# Patient Record
Sex: MALE
[De-identification: names, ages, dates, MRNs, and addresses within clinical notes are randomized per-mention and may not be internally consistent; named-entity substitution may affect disease eponyms.]

---

## 2018-10-04 ENCOUNTER — APPOINTMENT (OUTPATIENT)
Dept: OTOLARYNGOLOGY | Facility: CLINIC | Age: 33
End: 2018-10-04
Payer: SELF-PAY

## 2018-10-04 ENCOUNTER — APPOINTMENT (OUTPATIENT)
Dept: OTOLARYNGOLOGY | Facility: CLINIC | Age: 33
End: 2018-10-04
Payer: COMMERCIAL

## 2018-10-04 VITALS — WEIGHT: 180 LBS | HEIGHT: 68 IN | BODY MASS INDEX: 27.28 KG/M2

## 2018-10-04 DIAGNOSIS — Z78.9 OTHER SPECIFIED HEALTH STATUS: ICD-10-CM

## 2018-10-04 DIAGNOSIS — R19.6 HALITOSIS: ICD-10-CM

## 2018-10-04 PROBLEM — Z00.00 ENCOUNTER FOR PREVENTIVE HEALTH EXAMINATION: Status: ACTIVE | Noted: 2018-10-04

## 2018-10-04 PROCEDURE — 41599A: CUSTOM

## 2018-10-04 PROCEDURE — 99203 OFFICE O/P NEW LOW 30 MIN: CPT | Mod: 25

## 2018-10-04 PROCEDURE — 31575 DIAGNOSTIC LARYNGOSCOPY: CPT

## 2024-01-04 ENCOUNTER — APPOINTMENT (OUTPATIENT)
Dept: OTOLARYNGOLOGY | Facility: CLINIC | Age: 39
End: 2024-01-04
Payer: SELF-PAY

## 2024-01-04 VITALS
OXYGEN SATURATION: 98 % | SYSTOLIC BLOOD PRESSURE: 113 MMHG | HEART RATE: 90 BPM | WEIGHT: 199 LBS | TEMPERATURE: 98.3 F | BODY MASS INDEX: 30.16 KG/M2 | DIASTOLIC BLOOD PRESSURE: 80 MMHG | HEIGHT: 68 IN

## 2024-01-04 DIAGNOSIS — H91.93 UNSPECIFIED HEARING LOSS, BILATERAL: ICD-10-CM

## 2024-01-04 DIAGNOSIS — H61.23 IMPACTED CERUMEN, BILATERAL: ICD-10-CM

## 2024-01-04 PROCEDURE — 99203 OFFICE O/P NEW LOW 30 MIN: CPT | Mod: 25

## 2024-01-04 PROCEDURE — 69210 REMOVE IMPACTED EAR WAX UNI: CPT

## 2024-01-04 NOTE — HISTORY OF PRESENT ILLNESS
[de-identified] : Hx impactions & needs periodic screening. Has some mild bilat mild hearing loss but none at baseline. No tinnitus. No qtips

## 2024-01-04 NOTE — PHYSICAL EXAM
[Binocular Microscopic Exam] : Binocular microscopic exam was performed [FreeTextEntry8] : deep cerumen impaction removed with suction after pretreatment w/ H2O2 [FreeTextEntry9] : deep cerumen impaction removed with suction after pretreatment w/ H2O2 [Normal] : no masses and lesions seen, face is symmetric

## 2024-01-04 NOTE — ASSESSMENT
[FreeTextEntry1] : RTC for any ongoing sense of hearing loss for a ; RTC for an ear cleaning in 6 months, sooner prn any ongoing sxs or changes.

## 2024-01-17 ENCOUNTER — APPOINTMENT (OUTPATIENT)
Dept: OTOLARYNGOLOGY | Facility: CLINIC | Age: 39
End: 2024-01-17
Payer: SELF-PAY

## 2024-01-17 VITALS
HEIGHT: 68 IN | BODY MASS INDEX: 28.79 KG/M2 | HEART RATE: 73 BPM | WEIGHT: 190 LBS | OXYGEN SATURATION: 99 % | DIASTOLIC BLOOD PRESSURE: 83 MMHG | SYSTOLIC BLOOD PRESSURE: 124 MMHG | TEMPERATURE: 98 F

## 2024-01-17 DIAGNOSIS — R19.6 HALITOSIS: ICD-10-CM

## 2024-01-17 DIAGNOSIS — R09.82 POSTNASAL DRIP: ICD-10-CM

## 2024-01-17 PROCEDURE — 41599A: CUSTOM

## 2024-01-17 NOTE — PHYSICAL EXAM
[Midline] : trachea located in midline position [Normal] : tympanic membranes are normal in both ears [de-identified] : Severe tongue fissures [de-identified] : gait steady

## 2024-01-17 NOTE — HISTORY OF PRESENT ILLNESS
[de-identified] : 39 y/o M presents for tongue biofilm debridement with water laser for halitosis and tongue coating.

## 2024-01-17 NOTE — ASSESSMENT
[FreeTextEntry1] : 1. halitosis -risks, benefits, and alternatives discussed for tongue debridement with water laser -pt wished to proceed -performed with no issues -brushed tongue with antiseptic solution -oral cavity rinsed with mouthwash, continue to use mouthwash at home BID RTC in 6 months 2. postnasal drip -nasal saline rinses RTC in 6 months

## 2025-03-10 ENCOUNTER — APPOINTMENT (OUTPATIENT)
Dept: OTOLARYNGOLOGY | Facility: CLINIC | Age: 40
End: 2025-03-10
Payer: SELF-PAY

## 2025-03-10 DIAGNOSIS — H61.23 IMPACTED CERUMEN, BILATERAL: ICD-10-CM

## 2025-03-10 PROCEDURE — 99202 OFFICE O/P NEW SF 15 MIN: CPT
